# Patient Record
Sex: FEMALE | Race: AMERICAN INDIAN OR ALASKA NATIVE
[De-identification: names, ages, dates, MRNs, and addresses within clinical notes are randomized per-mention and may not be internally consistent; named-entity substitution may affect disease eponyms.]

---

## 2020-10-05 ENCOUNTER — HOSPITAL ENCOUNTER (OUTPATIENT)
Dept: HOSPITAL 5 - ED | Age: 80
Setting detail: OBSERVATION
LOS: 2 days | Discharge: HOME | End: 2020-10-07
Attending: INTERNAL MEDICINE | Admitting: INTERNAL MEDICINE
Payer: COMMERCIAL

## 2020-10-05 DIAGNOSIS — I50.31: ICD-10-CM

## 2020-10-05 DIAGNOSIS — I11.0: Primary | ICD-10-CM

## 2020-10-05 DIAGNOSIS — R55: ICD-10-CM

## 2020-10-05 DIAGNOSIS — Z98.890: ICD-10-CM

## 2020-10-05 DIAGNOSIS — E11.9: ICD-10-CM

## 2020-10-05 DIAGNOSIS — Z90.710: ICD-10-CM

## 2020-10-05 LAB
ALBUMIN SERPL-MCNC: 4.4 G/DL (ref 3.9–5)
ALT SERPL-CCNC: 9 UNITS/L (ref 7–56)
APTT BLD: 27.2 SEC. (ref 24.2–36.6)
BASOPHILS # (AUTO): 0.1 K/MM3 (ref 0–0.1)
BASOPHILS NFR BLD AUTO: 1 % (ref 0–1.8)
BUN SERPL-MCNC: 18 MG/DL (ref 7–17)
BUN/CREAT SERPL: 18 %
CALCIUM SERPL-MCNC: 10.4 MG/DL (ref 8.4–10.2)
EOSINOPHIL # BLD AUTO: 0 K/MM3 (ref 0–0.4)
EOSINOPHIL NFR BLD AUTO: 0.5 % (ref 0–4.3)
HCT VFR BLD CALC: 40.2 % (ref 30.3–42.9)
HEMOLYSIS INDEX: 6
HGB BLD-MCNC: 13.4 GM/DL (ref 10.1–14.3)
INR PPP: 0.86 (ref 0.87–1.13)
LYMPHOCYTES # BLD AUTO: 1.7 K/MM3 (ref 1.2–5.4)
LYMPHOCYTES NFR BLD AUTO: 20.6 % (ref 13.4–35)
MCHC RBC AUTO-ENTMCNC: 33 % (ref 30–34)
MCV RBC AUTO: 87 FL (ref 79–97)
MONOCYTES # (AUTO): 0.4 K/MM3 (ref 0–0.8)
MONOCYTES % (AUTO): 4.4 % (ref 0–7.3)
PLATELET # BLD: 263 K/MM3 (ref 140–440)
RBC # BLD AUTO: 4.6 M/MM3 (ref 3.65–5.03)
T4 FREE SERPL-MCNC: 1.08 NG/DL (ref 0.76–1.46)
T4 FREE SERPL-MCNC: 1.09 NG/DL (ref 0.76–1.46)

## 2020-10-05 PROCEDURE — 93005 ELECTROCARDIOGRAM TRACING: CPT

## 2020-10-05 PROCEDURE — 96365 THER/PROPH/DIAG IV INF INIT: CPT

## 2020-10-05 PROCEDURE — 80048 BASIC METABOLIC PNL TOTAL CA: CPT

## 2020-10-05 PROCEDURE — 71275 CT ANGIOGRAPHY CHEST: CPT

## 2020-10-05 PROCEDURE — 73030 X-RAY EXAM OF SHOULDER: CPT

## 2020-10-05 PROCEDURE — G0378 HOSPITAL OBSERVATION PER HR: HCPCS

## 2020-10-05 PROCEDURE — 84439 ASSAY OF FREE THYROXINE: CPT

## 2020-10-05 PROCEDURE — 85025 COMPLETE CBC W/AUTO DIFF WBC: CPT

## 2020-10-05 PROCEDURE — 36415 COLL VENOUS BLD VENIPUNCTURE: CPT

## 2020-10-05 PROCEDURE — 85610 PROTHROMBIN TIME: CPT

## 2020-10-05 PROCEDURE — 84484 ASSAY OF TROPONIN QUANT: CPT

## 2020-10-05 PROCEDURE — 70450 CT HEAD/BRAIN W/O DYE: CPT

## 2020-10-05 PROCEDURE — 83880 ASSAY OF NATRIURETIC PEPTIDE: CPT

## 2020-10-05 PROCEDURE — 93880 EXTRACRANIAL BILAT STUDY: CPT

## 2020-10-05 PROCEDURE — 82550 ASSAY OF CK (CPK): CPT

## 2020-10-05 PROCEDURE — 80053 COMPREHEN METABOLIC PANEL: CPT

## 2020-10-05 PROCEDURE — 82553 CREATINE MB FRACTION: CPT

## 2020-10-05 PROCEDURE — 81001 URINALYSIS AUTO W/SCOPE: CPT

## 2020-10-05 PROCEDURE — 85730 THROMBOPLASTIN TIME PARTIAL: CPT

## 2020-10-05 PROCEDURE — 85379 FIBRIN DEGRADATION QUANT: CPT

## 2020-10-05 PROCEDURE — 82962 GLUCOSE BLOOD TEST: CPT

## 2020-10-05 PROCEDURE — 84443 ASSAY THYROID STIM HORMONE: CPT

## 2020-10-05 PROCEDURE — 99285 EMERGENCY DEPT VISIT HI MDM: CPT

## 2020-10-05 PROCEDURE — 72125 CT NECK SPINE W/O DYE: CPT

## 2020-10-05 PROCEDURE — 83735 ASSAY OF MAGNESIUM: CPT

## 2020-10-05 PROCEDURE — 93306 TTE W/DOPPLER COMPLETE: CPT

## 2020-10-05 RX ADMIN — Medication SCH ML: at 21:56

## 2020-10-05 NOTE — CAT SCAN REPORT
CTA CHEST WITH CONTRAST



INDICATION / CLINICAL INFORMATION:

Syncope, elevated d-dimer.



TECHNIQUE:

Axial CT images were obtained through the chest after injection of 100 cc Omnipaque 350 IV contrast. 
3 plane MIP and/or 3D reconstructions were produced. All CT scans at this location are performed usin
g CT dose reduction for ALARA by means of automated exposure control. 



COMPARISON:

None available.



FINDINGS:

PULMONARY ARTERIES: No pulmonary emboli.

THORACIC AORTA: Mild atherosclerotic calcification without acute abnormality. 

HEART: Mild coronary artery atherosclerotic ossification.

MEDIASTINUM / AMBER: No significant abnormality.

PLEURA: No pleural effusion. No pneumothorax.

LUNGS: No acute air space or interstitial disease. 



UPPER ABDOMEN: No significant abnormality.



SKELETAL STRUCTURES: Diffuse degenerative change without acute fracture or aggressive osseous lesion.




ADDITIONAL FINDINGS: None.



IMPRESSION:

1. No CT evidence for pulmonary embolism. 

2. No acute findings.

 



Signer Name: Ricardo Martínez MD 

Signed: 10/5/2020 6:27 PM

Workstation Name: [facility] [id number]

## 2020-10-05 NOTE — CAT SCAN REPORT
CT head/brain wo con



INDICATION:

Syncope, left occipital headache.



TECHNIQUE: Routine CT head without contrast. All CT scans at this location are performed using CT dos
e reduction for ALARA by means of automated exposure control.



COMPARISON: 

None.



FINDINGS:



BRAIN / INTRACRANIAL CONTENTS: No acute hemorrhage, mass effect, midline shift, or hydrocephalus. No 
appreciable acute large territorial or lacunar infarct. No chronic infarct. Age-commensurate ventricu
lar and cisternal/sulcal prominence. Mild chronic small vessel ischemic change in the cerebral white 
matter.



ORBITS: No significant abnormality of visualized orbits.

SINUSES / MASTOIDS: No significant abnormality of visualized sinuses and mastoid air cells.



ADDITIONAL FINDINGS: None. 



IMPRESSION:

1. No acute intracranial abnormality. 



Signer Name: Faustino Mukherjee MD 

Signed: 10/5/2020 5:08 PM

Workstation Name: [id number]

## 2020-10-05 NOTE — CAT SCAN REPORT
CT CERVICAL SPINE WITHOUT CONTRAST



INDICATION:

Cervical pain after fall.



TECHNIQUE:

Axial CT images of the spine were obtained. Sagittal and coronal reformatted images were produced. Al
l CT scans at this location are performed using CT dose reduction for ALARA by means of automated exp
osure control. 



COMPARISON:

None available.



FINDINGS:

ACUTE FRACTURE(S) OR SUBLUXATION: None.



SPINAL DEGENERATIVE CHANGES: There is mild DJD in the atlantodental articulation. There is mild gener
alized spondylosis with small anterior and lateral osteophytes as well as mild bilateral facet DJD fr
om C2 through C5. There is no appreciable significant spinal canal stenosis or neural foraminal steno
sis.



PARASPINAL SOFT TISSUES: No soft tissue swelling or other acute abnormalities. 



ADDITIONAL FINDINGS: There are small subcentimeter thyroid nodules that do not require dedicated foll
ow-up.



IMPRESSION:

1. No acute fracture or subluxation in the spine in neutral position.



Signer Name: Faustnio Mukherjee MD 

Signed: 10/5/2020 5:05 PM

Workstation Name: [id number]

## 2020-10-05 NOTE — HISTORY AND PHYSICAL REPORT
History of Present Illness


Chief complaint: 


Get tired and just passed out





History of present illness: 


79 YO Female with HTN, DM presents to ED for evaluation.  Patient states that 

she experienced sudden onset of fatigue and then lost consciousness.  Patient 

states that she had experienced decreased exercise tolerance, generalized 

weakness over the past 1 week with progressively worsening symptoms over the 

same timeframe.  Patient states that she went to shopping today and while 

walking she experienced a sudden onset of weakness and dizziness and 

subsequently returned to her vehicle to sit down with resolution of symptoms.  

Patient states that she then went to another store and while walking and 

shopping she felt a return of weakness and subsequently lost consciousness.  

Patient fell striking the left side of her head.  EMS was notified and upon 

arrival the patient was found to be in distress and subsequently transported to 

[facility] for further care and evaluation of the aforementioned symptoms.  Patient 

seen and evaluated in the emergency department.  Lab and imaging studies 

reviewed.  Patient found to have symptoms consistent with diastolic CHF, as well

as syncopal episode.  Patient admitted to telemetry due to increased risk of 

cardiac decompensation.  Patient placed on telemetry monitoring.  Echocardiogram

ordered at time of admission and is pending currently.  Patient denies fever, 

chills, chest pain, palpitations, productive cough, skin rash, recent ill 

contacts, or known exposure to COVID-19.  No prior admission for review.  No 

medication listed for reconciliation at the time of admission.  Advanced care 

planning conducted in ED.











Past History


Past Medical History: diabetes, hypertension, other (See HPI)


Past Surgical History: hysterectomy, bowel surgery


Social history: single.  denies: smoking, alcohol abuse, prescription drug abuse


Family history: diabetes, hypertension





Medications and Allergies


                                    Allergies











Allergy/AdvReac Type Severity Reaction Status Date / Time


 


lisinopril Allergy  Angioedema Verified 10/05/20 16:24














Review of Systems


Constitutional: no weight loss, no weight gain, no fever, no chills


Ears, nose, mouth and throat: no ear pain, no ear discharge, no tinnitis, no 

decreased hearing, no nose pain


Breasts: no change in shape, no swelling, no mass


Cardiovascular: syncope, decreased exercise tolerance, no chest pain, no 

palpitations, no rapid/irregular heart beat, no edema


Respiratory: no cough, no cough with sputum, no shortness of breath, no dyspnea 

on exertion


Gastrointestinal: no nausea, no vomiting, no diarrhea


Genitourinary Female: no pelvic pain, no flank pain


Rectal: no pain, no incontinence, no bleeding


Musculoskeletal: no neck stiffness, no neck pain, no shooting arm pain, no arm 

numbness/tingling, no low back pain


Integumentary: no rash, no pruritis, no redness, no sores, no wounds


Neurological: no weakness, no parathesias, no numbness, no tingling, no seizures


Psychiatric: no anxiety, no memory loss, no change in sleep habits, no sleep 

disturbances, no insomnia, no change in appetite, no change in libido


Endocrine: no cold intolerance, no heat intolerance, no polyphagia, no excessive

thirst, no polydipsia, no polyuria


Hematologic/Lymphatic: no easy bruising


Allergic/Immunologic: no urticaria, no allergic rhinitis, no persistent 

infections





Exam





- Constitutional


Vitals: 


                                        











Temp Pulse Resp BP Pulse Ox


 


    78   16   144/70   97 


 


    10/05/20 18:37  10/05/20 18:37  10/05/20 18:37  10/05/20 18:00











General appearance: Present: mild distress





- EENT


Eyes: Present: PERRL


ENT: hearing intact, clear oral mucosa





- Neck


Neck: Present: supple, normal ROM





- Respiratory


Respiratory effort: normal


Respiratory: bilateral: CTA





- Cardiovascular


Heart Sounds: Present: S1 & S2.  Absent: rub, click





- Extremities


Extremities: pulses symmetrical, No edema


Peripheral Pulses: within normal limits





- Abdominal


General gastrointestinal: Present: soft, non-tender, non-distended, normal bowel

sounds


Female genitourinary: Present: normal





- Integumentary


Integumentary: Present: clear, warm, dry





- Musculoskeletal


Musculoskeletal: gait normal, strength equal bilaterally





- Psychiatric


Psychiatric: appropriate mood/affect, intact judgment & insight





- Neurologic


Neurologic: CNII-XII intact, moves all extremities





HEART Score





- HEART Score


Troponin: 


                                        











Troponin T  < 0.010 ng/mL (0.00-0.029)   10/05/20  17:06    














Results





- Labs


CBC & Chem 7: 


                                 10/05/20 17:06





                                 10/05/20 17:06


Labs: 


                              Abnormal lab results











  10/05/20 10/05/20 10/05/20 Range/Units





  17:06 17:06 17:06 


 


Seg Neutrophils %  73.5 H    (40.0-70.0)  %


 


PT   11.9 L   (12.2-14.9)  Sec.


 


INR   0.86 L   (0.87-1.13)  


 


D-Dimer   525.75 H   (0-234)  ng/mlDDU


 


BUN    18 H  (7-17)  mg/dL


 


Glucose    175 H  ()  mg/dL


 


Calcium    10.4 H  (8.4-10.2)  mg/dL


 


Magnesium    1.50 L  (1.7-2.3)  mg/dL














Assessment and Plan





- Patient Problems


(1) Diastolic CHF


Current Visit: Yes   Status: Acute   


Qualifiers: 


   Heart failure chronicity: acute   Qualified Code(s): I50.31 - Acute diastolic

(congestive) heart failure   


Plan to address problem: 


Strict I's/O, monitor urine output every shift, afterload reduction, blood 

pressure control, echocardiogram ordered and is pending at time of admission, th

yroid panel, magnesium level, supportive care.  BNP.








(2) Syncope


Current Visit: Yes   Status: Acute   


Qualifiers: 


   Encounter type: initial encounter 


Plan to address problem: 


CT head, neuro check, telemetry monitoring, CTA chest, d-dimer, supplemental 

oxygen, pulse oximetry,








(3) Hypertension


Current Visit: Yes   Status: Acute   


Qualifiers: 


   Hypertension type: essential hypertension   Qualified Code(s): I10 - 

Essential (primary) hypertension   


Plan to address problem: 


Monitor blood pressure every shift, continue medical management.








(4) Diabetes


Current Visit: Yes   Status: Acute   


Qualifiers: 


   Diabetes mellitus type: type 1 


Plan to address problem: 


Consistent carbohydrate diet, sliding scale insulin, Accu-Chek, hypoglycemia 

protocol








(5) DVT prophylaxis


Current Visit: Yes   Status: Acute   


Plan to address problem: 


SCD to bilateral lower extremities while in bed, patient is ambulatory.








(6) Advance care planning


Current Visit: Yes   Status: Acute   


Plan to address problem: 


Disease education conducted, patient is full code, prognosis discussed, patient 

knowledges understanding and agreement with care plan, +30 minutes.

## 2020-10-05 NOTE — EMERGENCY DEPARTMENT REPORT
HPI





- General


Chief Complaint: Syncope


Time Seen by Provider: 10/05/20 16:25





- [facility]


HPI: 





Room 5








The patient is a 80-year-old female present with a chief complaint of syncope.  

The patient states this afternoon she was in a store when she began to feel di

zzy.  The patient states she went to the car to sit down and her symptoms 

improved.  The patient states she went to [place].  The patient states she attempted to call her 

daughter but she began to feel weak diffusely and started "seeing red."  Patient

then had a syncopal episode and fell she states striking left side of her head. 

The patient states her next memory is awakening with EMS nearby moving her to a 

stretcher.  The patient states she did not become completely lucid until 

arriving in the emergency department.  The patient states while in the emergency

department she felt palpitations which lasted less than 5 minutes.  Patient 

denies chest pain, headache, abdominal pain or pain of any type.  Patient states

she may have had slight shortness of breath or tachypnea.  Patient denies fever 

or cough.  Patient denies any known contact with COVID positive patients.  

Patient denies melena or bright red blood per rectum.  When asked how she is 

feeling now the patient states she feels pretty good except for left-sided 

headache which she gives a score of 5/10





ED Past Medical Hx





- Past Medical History


Previous Medical History?: Yes


Hx Hypertension: Yes


Hx Diabetes: Yes





- Surgical History


Past Surgical History?: Yes


Additional Surgical History: Colon resection-benign colonic mass, hysterectomy





- Family History


Family history: no significant





- Social History


Smoking Status: Never Smoker


Substance Use Type: None





ED Review of Systems


ROS: 


Stated complaint: SYNCOPE


Other details as noted in HPI





Constitutional: denies: fever


Eyes: denies: eye pain


ENT: denies: throat pain


Respiratory: shortness of breath


Cardiovascular: palpitations.  denies: chest pain


Endocrine: no symptoms reported


Gastrointestinal: denies: abdominal pain


Genitourinary: denies: dysuria


Musculoskeletal: arthralgia


Neurological: headache





Physical Exam





- Physical Exam


Physical Exam: 





GENERAL: The patient is well-developed well-nourished female lying on stretcher 

not appearing to be in acute distress. []


HEENT: Normocephalic.  Atraumatic.  Extraocular motions are intact.  Patient has

 moist mucous membranes.


NECK: Supple.  Upper cervical tenderness to palpation.  No axial step-off


CHEST/LUNGS: Clear to auscultation.  There is no respiratory distress noted.


HEART/CARDIOVASCULAR: Regular.  There is no tachycardia.  There is no gallop rub

 or murmur.


ABDOMEN: Abdomen is soft, nontender.  Patient has normal bowel sounds.  There is

 no abdominal distention.


SKIN: There is no rash.  There is no edema.  There is no diaphoresis.


NEURO: The patient is awake, alert, and oriented.  The patient is cooperative.  

The patient has no focal neurologic deficits.  The patient has normal speech.  

Cranial nerves II through XII grossly intact


MUSCULOSKELETAL:  There is no evidence of acute injury.





ED Medical Decision Making





- Lab Data


Result diagrams: 


                                 10/05/20 17:06





                                 10/05/20 17:06





- EKG Data


-: EKG Interpreted by Me


EKG shows normal: sinus rhythm


Rate: normal





- EKG Data


When compared to previous EKG there are: previous EKG unavailable


Interpretation: nonspecific ST-T wave jacqueline (T wave inversion in lead III)





- Radiology Data


Radiology results: report reviewed (CT head, CT cervical spine, CT chest), image

 reviewed (CT head, CT cervical spine, left shoulder x-ray, CT chest)


interpreted by me: 





Left shoulder x-ray-no acute fracture, no dislocation, no foreign body seen





Findings


[facility] [address] [place] [address] Cat

 Scan Report Signed Patient: DINAH SAUCEDO MR#:  [id number] : 1940 

Acct:[id number] Age/Sex: 80 / F ADM Date: 10/05/20 Loc: ED Attending Dr: 

Ordering Physician: JESUS SWANSON MD Date of Service: 10/05/20 Procedure(s): CT 

head/brain wo con Accession Number(s): [id number] cc: JESUS SWANSON MD CT head/brain

 wo con INDICATION: Syncope, left occipital headache. TECHNIQUE: Routine CT head

 without contrast. All CT scans at this location are performed using CT dose 

reduction for ALARA by means of automated exposure control. COMPARISON: None. 

FINDINGS: BRAIN / INTRACRANIAL CONTENTS: No acute hemorrhage, mass effect, mi

dline shift, or hydrocephalus. No appreciable acute large territorial or lacunar

 infarct. No chronic infarct. Age-commensurate ventricular and cisternal/sulcal 

prominence. Mild chronic small vessel ischemic change in the cerebral white 

matter. ORBITS: No significant abnormality of visualized orbits. SINUSES / 

MASTOIDS: No significant abnormality of visualized sinuses and mastoid air 

cells. ADDITIONAL FINDINGS: None. IMPRESSION: 1. No acute intracranial 

abnormality. Signer Name: Faustino Mukherjee MD Signed: 10/5/2020 5:08 PM Workstation 

Name: [id number] Transcribed By: ELANA Dictated By: Faustino Mukherjee MD 

Electronically Authenticated By: Faustino Mukherjee MD Signed Date/Time: 10/05/20 

1708 DD/DT: 10/05/20 1707 TD/TT: 





Findings


[facility] [address] [place] [address] Cat

 Scan Report Signed Patient: DINAH SAUCEDO MR#:  [id number] : 1940 

Acct:[id number] Age/Sex: 80 / F ADM Date: 10/05/20 Loc: ED Attending Dr: 

Ordering Physician: JESUS SWANSON MD Date of Service: 10/05/20 Procedure(s): CT 

cervical spine wo con Accession Number(s): [id number] cc: JESUS SWANSON MD CT 

CERVICAL SPINE WITHOUT CONTRAST INDICATION: Cervical pain after fall. TECHNIQUE:

 Axial CT images of the spine were obtained. Sagittal and coronal reformatted 

images were produced. All CT scans at this location are performed using CT dose 

reduction for ALARA by means of automated exposure control. COMPARISON: None 

available. FINDINGS: ACUTE FRACTURE(S) OR SUBLUXATION: None. SPINAL DEGENERATIVE

 CHANGES: There is mild DJD in the atlantodental articulation. There is mild 

generalized spondylosis with small anterior and lateral osteophytes as well as 

mild bilateral facet DJD from C2 through C5. There is no appreciable significant

 spinal canal stenosis or neural foraminal stenosis. PARASPINAL SOFT TISSUES: No

 soft tissue swelling or other acute abnormalities. ADDITIONAL FINDINGS: There 

are small subcentimeter thyroid nodules that do not require dedicated follow-up.

 IMPRESSION: 1. No acute fracture or subluxation in the spine in neutral 

position. Signer Name: Faustino Mukherjee MD Signed: 10/5/2020 5:05 PM Workstation 

Name: [id number] Transcribed By: ELANA Dictated By: Faustino Mukherjee MD 

Electronically Authenticated By: Faustino Mukherjee MD Signed Date/Time: 10/05/20 

1705 DD/DT: 10/05/20 1704 TD/TT: 








Findings


[facility] [address] [place] [address] Cat

 Scan Report Signed Patient: DINAH SAUCEDO MR#:  [id number] : 1940 

Acct:[id number] Age/Sex: 80 / F ADM Date: 10/05/20 Loc: ED Attending Dr: 

Ordering Physician: EJSUS SWANSON MD Date of Service: 10/05/20 Procedure(s): CT 

angio chest Accession Number(s): [id number] cc: JESUS SWANSON MD CTA CHEST WITH 

CONTRAST INDICATION / CLINICAL INFORMATION: Syncope, elevated d-dimer. 

TECHNIQUE: Axial CT images were obtained through the chest after injection of 

100 cc Omnipaque 350 IV contrast. 3 plane MIP and/or 3D reconstructions were 

produced. All CT scans at this location are performed using CT dose reduction 

for ALARA by means of automated exposure control. COMPARISON: None available. 

FINDINGS: PULMONARY ARTERIES: No pulmonary emboli. THORACIC AORTA: Mild atherosc

lerotic calcification without acute abnormality. HEART: Mild coronary artery 

atherosclerotic ossification. MEDIASTINUM / AMBER: No significant abnormality. 

PLEURA: No pleural effusion. No pneumothorax. LUNGS: No acute air space or 

interstitial disease. UPPER ABDOMEN: No significant abnormality. SKELETAL 

STRUCTURES: Diffuse degenerative change without acute fracture or aggressive 

osseous lesion. ADDITIONAL FINDINGS: None. IMPRESSION: 1. No CT evidence for 

pulmonary embolism. 2. No acute findings. Signer Name: Jose Manuel Martínez MD 

Signed: 10/5/2020 6:27 PM Workstation Name: [id number] Transcribed By:  

Dictated By: JOSE MANUEL MARTÍNEZ III Electronically Authenticated By: JOSE MANUEL MARTÍNEZ III Signed Date/Time: 10/05/20 1827 DD/DT: 10/05/20 1822 TD/TT: 











- Differential Diagnosis


Syncope, dysrhythmia, PE, ACS, symptomatic anemia, dehydration


Critical care attestation.: 


If time is entered above; I have spent that time in minutes in the direct care 

of this critically ill patient, excluding procedure time.








ED Disposition


Clinical Impression: 


 Syncope





Disposition: DC-09 OP ADMIT IP TO THIS HOSP


Is pt being admited?: Yes


Does the pt Need Aspirin: Yes


Condition: Fair


Instructions:  Syncope (ED)


Referrals: 


PRIMARY CARE,MD [Primary Care Provider] - 3-5 Days


Forms:  Accompanied Note


Time of Disposition: 18:43 (Hospitalist notified (Dr Briscoe))

## 2020-10-05 NOTE — XRAY REPORT
XR shoulder 2+V LT



INDICATION / CLINICAL INFORMATION:

Pain after fall.



COMPARISON: 

None available.



FINDINGS:



No acute fracture.  Normal alignment.  Joint spaces are preserved. No destructive osseous lesion or s
uspicious periosteal reaction.



Impression:

1.No acute fracture.



Signer Name: Heladio Agustin MD 

Signed: 10/5/2020 5:39 PM

Workstation Name: [id number]

## 2020-10-06 LAB
BILIRUB UR QL STRIP: (no result)
BLOOD UR QL VISUAL: (no result)
BUN SERPL-MCNC: 16 MG/DL (ref 7–17)
BUN/CREAT SERPL: 20 %
CALCIUM SERPL-MCNC: 9.5 MG/DL (ref 8.4–10.2)
HEMOLYSIS INDEX: 5
PH UR STRIP: 5 [PH] (ref 5–7)
PROT UR STRIP-MCNC: (no result) MG/DL
RBC #/AREA URNS HPF: 2 /HPF (ref 0–6)
UROBILINOGEN UR-MCNC: < 2 MG/DL (ref ?–2)
WBC #/AREA URNS HPF: 2 /HPF (ref 0–6)

## 2020-10-06 RX ADMIN — Medication SCH ML: at 22:29

## 2020-10-06 RX ADMIN — Medication SCH ML: at 13:35

## 2020-10-06 NOTE — VASCULAR LAB REPORT
DUPLEX DOPPLER ULTRASOUND CAROTID, BILATERAL



INDICATION:

Syncope. History of CHF, diabetes and hypertension.



COMPARISON:

None available.



FINDINGS:



RIGHT CAROTID: Calcified plaques at the carotid bulb and proximal ICA result in less than 50% stenosi
s by diameter.



CCA velocity: 101 cm/sec.

ICA peak systolic velocity: 111 cm/sec.

ICA/CCA PSV Ratio: 1.1.



Right Vertebral Artery: Antegrade flow.



LEFT CAROTID: Ossified plaques at the carotid bulb result in less than 50% stenosis by diameter.



CCA velocity: 86 cm/sec.

ICA peak systolic velocity: 70 cm/sec.

ICA/CCA PSV Ratio: 0.8.



Left Vertebral Artery: Antegrade flow.



IMPRESSION:

1. Right Internal Carotid Artery: Less than 50% diameter stenosis.

2. Left Internal Carotid Artery: Less than 50% diameter stenosis.







Velocity criteria are extrapolated from diameter data as defined by the Society of Radiologists in Ul
trasound Consensus Conference, Radiology 2003; 229;340-346.



Degree of      ||  ICA PSV  || Plaque           || ICA/CCA 

Stenosis (%) || (cm/sec)   || estimate (%) || PSV Ratio

==========================================

- Normal...............<125..............None.................<2.0  

- <50....................<125..............<50....................<2.0

- 50-69................125-230.........>50....................2.0-4.0

- >70 but <100....>230..............>50....................>4.0

- Near...................High, low, .....visible................variable 

    occlusion            or none 

- Total...................None.............visible;................N/A

     occlusion                               no lumen  



Signer Name: Kirt Hinds MD 

Signed: 10/6/2020 1:55 PM

Workstation Name: [id number]

## 2020-10-06 NOTE — PROGRESS NOTE
Assessment and Plan


Assessment and plan: 


--Syncope


Current Visit: Yes   Status: Acute   


Plan to address problem: 


CT head; no acute abnormality noted 


CTA chest; no PE no abnormality


Carotid Doppler; no hemodynamically significant stenosis


Echocardiogram; normal EF


CT cervical spine; no acute abnormality


Left shoulder x-ray; no acute abnormality





-- Hypertension


Current Visit: Yes   Status: Acute   


Plan to address problem: 


Monitor blood pressure every shift, continue medical management.





--Type II diabetes


Current Visit: Yes   Status: Acute   


Plan to address problem: 


Consistent carbohydrate diet, sliding scale insulin, Accu-Chek, hypoglycemia 

protocol





-- DVT prophylaxis


Current Visit: Yes   Status: Acute   


Plan to address problem: 


SCD to bilateral lower extremities while in bed, patient is ambulatory.





-- Advance care planning


Current Visit: Yes   Status: Acute   


Plan to address problem: 


Disease education conducted, patient is full code, prognosis discussed,


 patient aknowledges understanding and agreement





Physical therapy evaluation and treat


Fall precautions


Possible discharge home tomorrow if stable





DC planning per case management, possible home with home health as needed











History


Interval history: 


I have seen and examined at the bedside this morning


Patient's chart and reports reviewed


Patient feels better no new episodes of syncope


Syncope work-up so far is negative


Alert awake responding appropriately


Vital signs noted








Hospitalist Physical





- Constitutional


Vitals: 


                                        











Temp Pulse Resp BP Pulse Ox


 


 98.6 F   71   18   129/72   96 


 


 10/06/20 07:36  10/06/20 07:36  10/06/20 07:36  10/06/20 07:36  10/06/20 10:05











General appearance: Present: no acute distress, well-nourished





- EENT


Eyes: Present: PERRL, EOM intact





- Neck


Neck: Present: supple, normal ROM





- Respiratory


Respiratory effort: normal


Respiratory: bilateral: diminished, negative: rales, rhonchi, wheezing





- Cardiovascular


Rhythm: regular


Heart Sounds: Present: S1 & S2





- Extremities


Extremities: No edema





- Abdominal


General gastrointestinal: soft, non-tender, non-distended, normal bowel sounds





- Integumentary


Integumentary: Present: clear, warm





- Psychiatric


Psychiatric: appropriate mood/affect, cooperative





- Neurologic


Neurologic: moves all extremities





HEART Score





- HEART Score


Troponin: 


                                        











Troponin T  < 0.010 ng/mL (0.00-0.029)   10/05/20  17:06    














Results





- Labs


CBC & Chem 7: 


                                 10/05/20 17:06





                                 10/06/20 04:59


Labs: 


                             Laboratory Last Values











WBC  8.2 K/mm3 (4.5-11.0)   10/05/20  17:06    


 


RBC  4.60 M/mm3 (3.65-5.03)   10/05/20  17:06    


 


Hgb  13.4 gm/dl (10.1-14.3)   10/05/20  17:06    


 


Hct  40.2 % (30.3-42.9)   10/05/20  17:06    


 


MCV  87 fl (79-97)   10/05/20  17:06    


 


MCH  29 pg (28-32)   10/05/20  17:06    


 


MCHC  33 % (30-34)   10/05/20  17:06    


 


RDW  14.1 % (13.2-15.2)   10/05/20  17:06    


 


Plt Count  263 K/mm3 (140-440)   10/05/20  17:06    


 


Lymph % (Auto)  20.6 % (13.4-35.0)   10/05/20  17:06    


 


Mono % (Auto)  4.4 % (0.0-7.3)   10/05/20  17:06    


 


Eos % (Auto)  0.5 % (0.0-4.3)   10/05/20  17:06    


 


Baso % (Auto)  1.0 % (0.0-1.8)   10/05/20  17:06    


 


Lymph # (Auto)  1.7 K/mm3 (1.2-5.4)   10/05/20  17:06    


 


Mono # (Auto)  0.4 K/mm3 (0.0-0.8)   10/05/20  17:06    


 


Eos # (Auto)  0.0 K/mm3 (0.0-0.4)   10/05/20  17:06    


 


Baso # (Auto)  0.1 K/mm3 (0.0-0.1)   10/05/20  17:06    


 


Seg Neutrophils %  73.5 % (40.0-70.0)  H  10/05/20  17:06    


 


Seg Neutrophils #  6.0 K/mm3 (1.8-7.7)   10/05/20  17:06    


 


PT  11.9 Sec. (12.2-14.9)  L  10/05/20  17:06    


 


INR  0.86  (0.87-1.13)  L  10/05/20  17:06    


 


APTT  27.2 Sec. (24.2-36.6)   10/05/20  17:06    


 


D-Dimer  525.75 ng/mlDDU (0-234)  H  10/05/20  17:06    


 


Sodium  140 mmol/L (137-145)   10/06/20  04:59    


 


Potassium  3.3 mmol/L (3.6-5.0)  L  10/06/20  04:59    


 


Chloride  100.4 mmol/L ()   10/06/20  04:59    


 


Carbon Dioxide  26 mmol/L (22-30)   10/06/20  04:59    


 


Anion Gap  17 mmol/L  10/06/20  04:59    


 


BUN  16 mg/dL (7-17)   10/06/20  04:59    


 


Creatinine  0.8 mg/dL (0.6-1.2)   10/06/20  04:59    


 


Estimated GFR  > 60 ml/min  10/06/20  04:59    


 


BUN/Creatinine Ratio  20 %  10/06/20  04:59    


 


Glucose  160 mg/dL ()  H  10/06/20  04:59    


 


POC Glucose  156  ()  H  10/06/20  08:54    


 


Calcium  9.5 mg/dL (8.4-10.2)   10/06/20  04:59    


 


Magnesium  1.50 mg/dL (1.7-2.3)  L  10/05/20  17:06    


 


Total Bilirubin  0.20 mg/dL (0.1-1.2)   10/05/20  17:06    


 


AST  10 units/L (5-40)   10/05/20  17:06    


 


ALT  9 units/L (7-56)   10/05/20  17:06    


 


Alkaline Phosphatase  90 units/L ()   10/05/20  17:06    


 


Total Creatine Kinase  49 units/L ()   10/05/20  17:06    


 


CK-MB (CK-2)  1.2 ng/mL (0.0-4.0)   10/05/20  17:06    


 


CK-MB (CK-2) Rel Index  2.4  (0-4)   10/05/20  17:06    


 


Troponin T  < 0.010 ng/mL (0.00-0.029)   10/05/20  17:06    


 


NT-Pro-B Natriuret Pep  65.81 pg/mL (0-900)   10/05/20  19:35    


 


Total Protein  7.5 g/dL (6.3-8.2)   10/05/20  17:06    


 


Albumin  4.4 g/dL (3.9-5)   10/05/20  17:06    


 


Albumin/Globulin Ratio  1.4 %  10/05/20  17:06    


 


TSH  1.970 mlU/mL (0.270-4.200)   10/05/20  19:39    


 


Free T4  1.08 ng/dL (0.76-1.46)   10/05/20  19:39    


 


Urine Color  Straw  (Yellow)   10/06/20  02:00    


 


Urine Turbidity  Clear  (Clear)   10/06/20  02:00    


 


Urine pH  5.0  (5.0-7.0)   10/06/20  02:00    


 


Ur Specific Gravity  1.040  (1.003-1.030)  H  10/06/20  02:00    


 


Urine Protein  <15 mg/dl mg/dL (Negative)   10/06/20  02:00    


 


Urine Glucose (UA)  Neg mg/dL (Negative)   10/06/20  02:00    


 


Urine Ketones  Neg mg/dL (Negative)   10/06/20  02:00    


 


Urine Blood  Neg  (Negative)   10/06/20  02:00    


 


Urine Nitrite  Neg  (Negative)   10/06/20  02:00    


 


Urine Bilirubin  Neg  (Negative)   10/06/20  02:00    


 


Urine Urobilinogen  < 2.0 mg/dL (<2.0)   10/06/20  02:00    


 


Ur Leukocyte Esterase  Tr  (Negative)   10/06/20  02:00    


 


Urine WBC (Auto)  2.0 /HPF (0.0-6.0)   10/06/20  02:00    


 


Urine RBC (Auto)  2.0 /HPF (0.0-6.0)   10/06/20  02:00    


 


U Epithel Cells (Auto)  2.0 /HPF (0-13.0)   10/06/20  02:00    











Valdivia/IV: 


                                        





Voiding Method                   Toilet


IV Catheter Type [Left           INT / Saline Lock


Antecubital]                     











Active Medications





- Current Medications


Current Medications: 














Generic Name Dose Route Start Last Admin





  Trade Name Freq  PRN Reason Stop Dose Admin


 


Acetaminophen  650 mg  10/05/20 19:29  10/05/20 21:56





  Tylenol  PO   650 mg





  Q4H PRN   Administration





  Pain MILD(1-3)/Fever >100.5/HA  


 


Ondansetron HCl  4 mg  10/05/20 19:29 





  Zofran  IV  





  Q8H PRN  





  Nausea And Vomiting  


 


Sodium Chloride  10 ml  10/05/20 22:00  10/05/20 21:56





  Sodium Chloride Flush Syringe 10 Ml  IV   10 ml





  BID CATRACHO   Administration


 


Sodium Chloride  10 ml  10/05/20 19:29 





  Sodium Chloride Flush Syringe 10 Ml  IV  





  PRN PRN  





  LINE FLUSH

## 2020-10-07 VITALS — DIASTOLIC BLOOD PRESSURE: 82 MMHG | SYSTOLIC BLOOD PRESSURE: 139 MMHG

## 2020-10-07 RX ADMIN — Medication SCH ML: at 09:50

## 2020-10-07 NOTE — DISCHARGE SUMMARY
Providers





- Providers


Date of Admission: 


10/05/20 19:29





Date of discharge: 10/07/20


Attending physician: 


AMY J KOCHERLA





                                        





10/06/20 10:54


Physical Therapy Evaluation and Treat [CONS] Routine 


   Comment: 


   Reason For Exam: Evaluate and treat/syncope











Primary care physician: 


PRIMARY CARE MD








Hospitalization


Condition: Fair


Disposition: DC-01 TO HOME OR SELFCARE


Time spent for discharge: 32 min





Core Measure Documentation





- Palliative Care


Palliative Care/ Comfort Measures: Not Applicable





- Core Measures


Any of the following diagnoses?: none





Exam





- Constitutional


Vitals: 


                                        











Temp Pulse Resp BP Pulse Ox


 


 98.3 F   70   18   144/65   97 


 


 10/07/20 11:32  10/07/20 11:32  10/07/20 11:32  10/07/20 11:32  10/07/20 11:32











General appearance: Present: no acute distress, well-nourished





- EENT


Eyes: Present: PERRL, irregular pupil





- Neck


Neck: Present: supple, normal ROM





- Respiratory


Respiratory effort: normal


Respiratory: bilateral: diminished, negative: rales, rhonchi, wheezing





- Cardiovascular


Rhythm: regular


Heart Sounds: Present: S1 & S2





- Extremities


Extremities: no ischemia, No edema





- Abdominal


General gastrointestinal: Present: soft, non-tender, non-distended, normal bowel

 sounds





- Integumentary


Integumentary: Present: clear, warm





- Musculoskeletal


Musculoskeletal: strength equal bilaterally





- Psychiatric


Psychiatric: appropriate mood/affect, cooperative





- Neurologic


Neurologic: moves all extremities





Plan


Activity: advance as tolerated, fall precautions


Diet: diabetic


Additional Instructions: Fall precautions.  No new prescriptions.  If you have 

worsening symptoms contact MD or go to emergency room.  If you have recurrent 

episodes of syncope you need to see your private neurologist Dr. William Carson for 

further evaluation and management


Follow up with: 


PRIMARY CARE,MD [Primary Care Provider] - 3-5 Days


FELICE DIAZ MD [Staff Physician] - 7 Days


Forms:  Accompanied Note